# Patient Record
Sex: MALE | Race: WHITE
[De-identification: names, ages, dates, MRNs, and addresses within clinical notes are randomized per-mention and may not be internally consistent; named-entity substitution may affect disease eponyms.]

---

## 2017-06-27 ENCOUNTER — HOSPITAL ENCOUNTER (OUTPATIENT)
Dept: HOSPITAL 39 - GMAM | Age: 57
Discharge: HOME | End: 2017-06-27
Attending: FAMILY MEDICINE
Payer: MEDICARE

## 2017-06-27 DIAGNOSIS — E03.9: Primary | ICD-10-CM

## 2017-07-06 ENCOUNTER — HOSPITAL ENCOUNTER (OUTPATIENT)
Dept: HOSPITAL 39 - GMAM | Age: 57
Discharge: HOME | End: 2017-07-06
Attending: FAMILY MEDICINE
Payer: MEDICARE

## 2017-07-06 DIAGNOSIS — N40.0: Primary | ICD-10-CM

## 2018-02-03 ENCOUNTER — HOSPITAL ENCOUNTER (EMERGENCY)
Dept: HOSPITAL 39 - ER | Age: 58
Discharge: HOME | End: 2018-02-03
Payer: MEDICARE

## 2018-02-03 VITALS — TEMPERATURE: 99.6 F

## 2018-02-03 VITALS — SYSTOLIC BLOOD PRESSURE: 159 MMHG | OXYGEN SATURATION: 97 % | DIASTOLIC BLOOD PRESSURE: 97 MMHG

## 2018-02-03 DIAGNOSIS — E07.9: ICD-10-CM

## 2018-02-03 DIAGNOSIS — Z87.891: ICD-10-CM

## 2018-02-03 DIAGNOSIS — N13.2: Primary | ICD-10-CM

## 2018-02-03 PROCEDURE — 36415 COLL VENOUS BLD VENIPUNCTURE: CPT

## 2018-02-03 PROCEDURE — 85025 COMPLETE CBC W/AUTO DIFF WBC: CPT

## 2018-02-03 PROCEDURE — 81001 URINALYSIS AUTO W/SCOPE: CPT

## 2018-02-03 PROCEDURE — 80053 COMPREHEN METABOLIC PANEL: CPT

## 2018-02-03 PROCEDURE — 74019 RADEX ABDOMEN 2 VIEWS: CPT

## 2018-02-03 PROCEDURE — 74176 CT ABD & PELVIS W/O CONTRAST: CPT

## 2018-02-03 NOTE — CT
EXAM DESCRIPTION: 



Abdoment/Pelvis w/o Contrast2/3/2018 6:25 PM CST



CLINICAL HISTORY: 



57 years, Male, hematuria, left abd and flank pain 2d



COMPARISON: 



None.



TECHNIQUE: 



Volumetric CT acquisition was performed through the abdomen and

pelvis. Images in the axial and coronal planes were presented for

interpretation

This exam was performed according to our departmental

dose-optimization program, which includes automated exposure

control, adjustment of the mA and/or kV according to patient size

and/or use of iterative reconstruction technique.



FINDINGS: 



The visualized portions of the lung bases are clear. The

cardiomediastinal structures are within normal limits.





Within the upper abdomen, the liver and spleen are normal in size

and morphology.

The gallbladder is normal in morphology. The intra/extrahepatic

biliary tree is normal in appearance. The pancreas and adrenal

glands are normal. 



The right kidney is normal in size and the right ureter is normal

in course and caliber. There are no right renal calculi, distal

obstructing stones, or evidence of hydronephrosis/hydroureter. 



The left kidney is edematous and enlarged. There is a 3 mm

partially obstructing stone in the distal left ureter on axial

image 83. There is mild to moderate left-sided hydronephrosis and

hydroureter with periureteral and perinephric stranding. There

are no additional left renal calculi or ureteral stones.



The stomach and small intestines are within normal limits without

evidence of bowel dilation or wall thickening. 

The appendix is well-visualized and normal, best seen on axial

image 61 medial to the cecum. 



The colon is stool filled and unremarkable. 

There are moderate descending and sigmoid colonic diverticula

without associated wall thickening or inflammatory changes.  



Within the pelvis, the bladder and rectum are normal. The

prostate is age-appropriate



There are no pathologically enlarged inguinal, retroperitoneal,

portacaval, or mesenteric lymph nodes.

The soft tissue structures of the abdominal wall are normal.

There are multilevel degenerative changes throughout the lumbar

spine with disc space narrowing and vacuum disc phenomenon. There

is associated facet hypertrophy at multiple levels.





The abdominal aorta and its primary branches are normal in course

and caliber. 

Limited evaluation of the venous structures demonstrates no gross

abnormalities.



IMPRESSION: 



1. Partially obstructing 3 mm distal left ureteral stone.

2. No additional renal calculi or ureteral stones.

3. Degenerative changes of the lumbar spine..

4. Diverticulosis without evidence of diverticulitis.



Electronically signed by:  Syeda Stockton MD  2/3/2018 6:30 PM

[facility] Workstation: [id number]

## 2018-02-03 NOTE — RAD
EXAM DESCRIPTION: 



Abdomen Series



CLINICAL HISTORY: 



57 years, Male, left mid abd pain, urinary hesitancy



COMPARISON: 



None.



TECHNIQUE: 



Acute abdominal series



FINDINGS: 



The lungs are clear without focal consolidation or pleural

effusion.

The heart is normal in size. The mediastinal contours are normal.

The bowel gas pattern is normal.

There is no evidence of free air.

There are no abnormal masses or calcifications.

Limited evaluation of the liver, spleen, and kidneys demonstrate

no gross abnormalities.

There is mild levoscoliotic curvature of the lumbar spine.



IMPRESSION: 



1. No acute cardiopulmonary disease.

2. No acute intraabdominal process.



Electronically signed by:  Syeda Stockton MD  2/3/2018 5:15 PM

CST Workstation: [email or web address]

## 2018-02-03 NOTE — ED.PDOC
History of Present Illness





- General


Chief Complaint: Abdominal Pain


Stated Complaint: L flank discomfort


Time Seen by Provider: 18 16:27


Source: patient


Exam Limitations: no limitations





- History of Present Illness


Initial Comments: 





The patient is a 57-year-old  male presenting to the emergency room 

secondary to left-sided abdominal pain has been present and slightly worsening 

for the last couple of days.  He has started having some very mild dysuria 

today.  He has not had any kidney stones in the past.  No history of 

diverticulitis. No fevers.  No nausea vomiting or diarrhea.  No constipation.  

No syncope or near syncope. The abdominal pain is somewhat midabdominal and 

does radiate to the back.


Severity: moderate


Improving Factors: nothing


Worsening Factors: nothing


Associated Symptoms: denies symptoms


Allergies/Adverse Reactions: 


Allergies





NO KNOWN ALLERGY Allergy (Verified 18 16:44)


 








Home Medications: 


Ambulatory Orders





Ciprofloxacin [Cipro] 500 mg PO BID #14 tab 18 


Tamsulosin HCl [Flomax] 0.4 mg PO DAILY #14 cap 18 











Review of Systems





- Review of Systems


Constitutional: States: no symptoms reported


EENTM: States: no symptoms reported


Respiratory: States: no symptoms reported


Cardiology: States: no symptoms reported


Gastrointestinal/Abdominal: States: abdominal pain


Genitourinary: States: see HPI


Musculoskeletal: States: no symptoms reported


Skin: States: no symptoms reported


Neurological: States: no symptoms reported


Endocrine: States: no symptoms reported


All other Systems: No Change from Baseline





Past Medical History (General)





- Patient Medical History


Hx Stroke: No


Hx Congestive Heart Failure: No


Hx Thyroid Disease: Yes


Hx Diabetes: No


Hx Gastroesophageal Reflux:  - Constipation





- Social History


Hx Tobacco Use: Yes - Quit 





Family Medical History





- Family History


  ** Father


Living Status: 


Cause of Death: old age





Physical Exam





- Physical Exam


General Appearance: Alert, Comfortable, No apparent distress


Eye Exam: bilateral normal


Ears, Nose, Throat: hearing grossly normal, normal ENT inspection, normal 

pharynx


Neck: non-tender, full range of motion, supple


Respiratory: chest non-tender, lungs clear, normal breath sounds, no 

respiratory distress, no accessory muscle use


Cardiovascular/Chest: normal peripheral pulses, regular rate, rhythm, no edema


Peripheral Pulses: radial,right: 2+, radial,left: 2+, dorsalis pedis,right: 2+, 

dorsalis pedis,left: 2+


Gastrointestinal/Abdominal: soft, other - obese.  Mid left abdominal discomfort 

palpation.


Rectal Exam: deferred


Back Exam: normal inspection, CVA tenderness (L)


Extremity: normal range of motion, non-tender, normal inspection, no pedal edema

, normal capillary refill


Neurologic: CNs II-XII nml as tested, alert, normal mood/affect, oriented x 3


Skin Exam: normal color


Comments: 





 Vital Signs - 24 hr











  18





  16:45


 


Temperature 99.6 F


 


Pulse Rate [ 75





Left Radial] 


 


Respiratory 20





Rate 


 


Blood Pressure 164/110





[Left Arm] 


 


O2 Sat by Pulse 96





Oximetry 














Progress





- Progress


Progress: 





18 20:28


the patient is a 57-year-old  male presenting with a left distal 

ureteral 3mm stone with partial obstruction.  Urinalysis only shows a small 

amount of blood.  No obvious infection.  The patient is  placed on 

ciprofloxacin primarily prophylactically for the next 7 days.  He was given 2 L 

of IV fluids and a dose of Flomax as well as a dose of ciprofloxacin here.  He 

will be written for Flomax daily for the next 2 weeks.  He needs to increase 

his fluid intake significantly.  He needs to follow up with his primary care 

doctor in 3 days for repeat evaluation.  If he is not passing the stone he may 

yet have to have lithotripsy after that time.  Renal function appears adequate.

  ER warnings were given for his worsening.  He can continue his home pain 

medications and he can add Aleve 2 tablets twice daily with food for the next 5 

days.  ER warnings were given.





- Results/Orders


Results/Orders: 





 Vital Signs - 8 hr











  18





  16:45


 


Temperature 99.6 F


 


Pulse Rate [ 75





Left Radial] 


 


Respiratory 20





Rate 


 


Blood Pressure 164/110





[Left Arm] 


 


O2 Sat by Pulse 96





Oximetry 








 Laboratory Tests











  18





  17:28 19:10 19:10


 


WBC   9.9 


 


RBC   5.29 


 


Hgb   15.9 


 


Hct   45.9 


 


MCV   86.7 


 


MCH   30.1 


 


MCHC   34.7 


 


RDW   13.0 


 


Plt Count   124 L 


 


MPV   8.2 


 


Absolute Neuts (auto)   8.20 H 


 


Absolute Lymphs (auto)   1.10 


 


Absolute Monos (auto)   0.50 


 


Absolute Eos (auto)   0.00 


 


Absolute Basos (auto)   0.10 


 


Neutrophils %   83.0 H 


 


Lymphocytes %   10.9 L 


 


Monocytes %   5.4 


 


Eosinophils %   0.2 L 


 


Basophils %   0.5 


 


Sodium    138


 


Potassium    3.9


 


Chloride    105


 


Carbon Dioxide    24


 


Anion Gap    12.9


 


BUN    17


 


Creatinine    1.29


 


BUN/Creatinine Ratio    13.2


 


Random Glucose    200 H


 


Serum Osmolality    282.9


 


Calcium    9.3


 


Total Bilirubin    0.8


 


AST    23


 


ALT    26


 


Alkaline Phosphatase    91


 


Serum Total Protein    7.9


 


Albumin    4.4


 


Globulin    3.5


 


Albumin/Globulin Ratio    1.3


 


Urine Color  Yellow  


 


Urine Appearance  Clear  


 


Urine pH  5.0  


 


Ur Specific Gravity  >= 1.030  


 


Urine Protein  Negative  


 


Urine Glucose (UA)  Negative  


 


Urine Ketones  Trace  


 


Urine Blood  Large H  


 


Urine Nitrite  Negative  


 


Urine Bilirubin  Negative  


 


Urine Urobilinogen  0.2  


 


Ur Leukocyte Esterase  Negative  


 


Urine RBC  5-10 H  


 


Urine WBC  0  


 


Ur Epithelial Cells  0  


 


Calcium Oxalate Crystal  1+  


 


Urine Bacteria  0  








CT scan of abdomen and pelvis shows a 3 mmleft distal ureteral stone with 

partial obstruction.  He does have symptoms hydronephrosis on the left. No 

other acute intra-abdominal pathology.





Departure





- Departure


Clinical Impression: 


 Ureterolithiasis





Disposition: Discharge to Home or Self Care


Condition: Fair


Departure Forms:  ED Discharge - Pt. Copy, Patient Portal Self Enrollment


Instructions:  DI for Kidney Stones


Diet: regular diet


Activity: increase activity as tolerated


Referrals: 


Leighton Villaseñor MD [Primary Care Provider] - 1-5 Days


Prescriptions: 


Ciprofloxacin [Cipro] 500 mg PO BID #14 tab


Tamsulosin HCl [Flomax] 0.4 mg PO DAILY #14 cap


Home Medications: 


Ambulatory Orders





Ciprofloxacin [Cipro] 500 mg PO BID #14 tab 18 


Tamsulosin HCl [Flomax] 0.4 mg PO DAILY #14 cap 18 








Additional Instructions: 


the patient is a 57-year-old  male presenting with a left distal 

ureteral 3mm stone with partial obstruction.  Urinalysis only shows a small 

amount of blood.  No obvious infection.  The patient is  placed on 

ciprofloxacin primarily prophylactically for the next 7 days.  He was given 2 L 

of IV fluids and a dose of Flomax as well as a dose of ciprofloxacin here.  He 

will be written for Flomax daily for the next 2 weeks.  He needs to increase 

his fluid intake significantly.  He needs to follow up with his primary care 

doctor in 3 days for repeat evaluation.  If he is not passing the stone he may 

yet have to have lithotripsy after that time.  Renal function appears adequate.

  ER warnings were given for his worsening.  He can continue his home pain 

medications and he can add Aleve 2 tablets twice daily with food for the next 5 

days.  ER warnings were given. additionally the patient's blood sugar was 

moderately elevated today.  This should also be followed up with his primary 

care doctor.

## 2018-02-06 ENCOUNTER — HOSPITAL ENCOUNTER (OUTPATIENT)
Dept: HOSPITAL 39 - GMAM | Age: 58
End: 2018-02-06
Attending: FAMILY MEDICINE
Payer: MEDICARE

## 2018-02-06 DIAGNOSIS — N20.9: ICD-10-CM

## 2018-02-06 DIAGNOSIS — N20.0: Primary | ICD-10-CM

## 2018-02-06 DIAGNOSIS — N20.1: ICD-10-CM

## 2018-04-02 ENCOUNTER — HOSPITAL ENCOUNTER (OUTPATIENT)
Dept: HOSPITAL 39 - GMAM | Age: 58
End: 2018-04-02
Attending: FAMILY MEDICINE
Payer: MEDICARE

## 2018-04-02 DIAGNOSIS — E03.9: Primary | ICD-10-CM

## 2018-05-04 ENCOUNTER — HOSPITAL ENCOUNTER (OUTPATIENT)
Dept: HOSPITAL 39 - RAD | Age: 58
End: 2018-05-04
Attending: ORTHOPAEDIC SURGERY
Payer: MEDICARE

## 2018-05-04 DIAGNOSIS — M25.551: ICD-10-CM

## 2018-05-04 DIAGNOSIS — M25.552: ICD-10-CM

## 2018-05-04 DIAGNOSIS — M25.562: ICD-10-CM

## 2018-05-04 DIAGNOSIS — M25.561: Primary | ICD-10-CM

## 2018-05-04 NOTE — RAD
EXAM DESCRIPTION: Pelvis



CLINICAL HISTORY: 57 years Male, PAIN IN HIPS



COMPARISON: None.



TECHNIQUE: Frontal view of pelvis and hips



FINDINGS:



Bones of the pelvic ring appear intact. Moderate degenerative

changes in the lower lumbar spine and at the SI joints and at the

pubic symphysis. Mild narrowing of the hip joints is seen. There

is moderate spurring at the right femoral head neck junction.

Minimal spurring at the superior left femoral head neck junction.

Mildly prominent trabecular pattern. No fracture or dislocation.



IMPRESSION:



Degenerative changes as described.





Electronically signed by:  Pascual Shaw MD  5/4/2018 11:38

AM CDT Workstation: [id number]

## 2018-05-04 NOTE — RAD
EXAM DESCRIPTION: Knee,Right Complete



CLINICAL HISTORY: 57 years, Male, KNEE PAIN



COMPARISON: None



TECHNIQUE: Four views of the right knee



FINDINGS:



No fracture or dislocation. Bones appear normally mineralized

with normal trabecular pattern.



Narrowed appearance of medial more than lateral compartments on

frontal view. Prominent spurring at the tibial spines. Lateral

view shows normal position of the patella. Mild posterior

patellar spurring and superior enthesopathy. No definite

suprapatellar knee joint effusion. Normal contour of quadriceps

and patellar tendons.



No abnormal patellar tilt or subluxation  on patellar sunrise

view. Mild posterior medial patellar spurring.



IMPRESSION:



Degenerative changes as described.



Electronically signed by:  Pascual Shaw MD  5/4/2018 11:37

AM CDT Workstation: [id number]

## 2018-05-04 NOTE — RAD
EXAM DESCRIPTION: Knee,Left Complete



CLINICAL HISTORY: 57 years, Male, KNEE PAIN



COMPARISON: None



TECHNIQUE: Four views of the left knee including standing views



FINDINGS:



No fracture or dislocation. Bones appear normally mineralized

with normal trabecular pattern.



Narrowed appearance of medial compartment on standing view 45

degrees flexion. Lateral view shows normal position of the

patella. Mild superior anterior patellar enthesopathy. Question

small suprapatellar knee joint effusion. Normal contour of

quadriceps and patellar tendons.



No abnormal patellar tilt or subluxation  on patellar sunrise

view.



IMPRESSION:



Degenerative changes as described.



Electronically signed by:  Pascual Shaw MD  5/4/2018 11:36

AM CDT Workstation: [id number]

## 2018-10-01 ENCOUNTER — HOSPITAL ENCOUNTER (OUTPATIENT)
Dept: HOSPITAL 39 - GMAM | Age: 58
End: 2018-10-01
Attending: FAMILY MEDICINE
Payer: MEDICARE

## 2018-10-01 DIAGNOSIS — Z12.5: ICD-10-CM

## 2018-10-01 DIAGNOSIS — E03.9: Primary | ICD-10-CM

## 2018-10-01 PROCEDURE — 84439 ASSAY OF FREE THYROXINE: CPT

## 2018-10-01 PROCEDURE — 84443 ASSAY THYROID STIM HORMONE: CPT

## 2018-10-10 ENCOUNTER — HOSPITAL ENCOUNTER (OUTPATIENT)
Dept: HOSPITAL 39 - GMAM | Age: 58
End: 2018-10-10
Attending: FAMILY MEDICINE
Payer: MEDICARE

## 2018-10-10 DIAGNOSIS — J30.1: Primary | ICD-10-CM

## 2018-10-10 DIAGNOSIS — E55.9: ICD-10-CM

## 2018-10-10 DIAGNOSIS — R53.83: ICD-10-CM

## 2019-06-10 ENCOUNTER — HOSPITAL ENCOUNTER (OUTPATIENT)
Dept: HOSPITAL 39 - CT | Age: 59
End: 2019-06-10
Attending: OTOLARYNGOLOGY
Payer: MEDICARE

## 2019-06-10 DIAGNOSIS — J32.9: Primary | ICD-10-CM

## 2019-06-11 NOTE — CT
EXAM DESCRIPTION:

Sinuses



CLINICAL HISTORY:

58 years Male, CHRONIC SINUSITIS



COMPARISON:

None.



TECHNIQUE:

CT of the paranasal sinuses was performed without IV contrast.

This exam was performed according to our departmental

dose-optimization program, which includes automated exposure

control, adjustment of the mA and/or kV according to patient size

and/or use of iterative reconstruction technique.



FINDINGS:

The paranasal sinuses are clear. The ethmoid and visualized

portions of the mastoid air cells are unremarkable. No fluid in

the middle ear. Rightward nasal septal bowing. Turbinates and

nasal passages are unremarkable. The ostiomeatal units are

bilaterally patent. No intraorbital inflammation or mass.



IMPRESSION:

Rightward nasal septal bowing, but no sinusitis. 



Electronically signed by:  Craig Bird MD  6/11/2019 7:35 AM CDT

Workstation: [id number]

## 2019-10-02 ENCOUNTER — HOSPITAL ENCOUNTER (OUTPATIENT)
Dept: HOSPITAL 39 - GMAM | Age: 59
End: 2019-10-02
Attending: FAMILY MEDICINE
Payer: MEDICARE

## 2019-10-02 DIAGNOSIS — Z12.5: Primary | ICD-10-CM

## 2019-10-02 DIAGNOSIS — I10: ICD-10-CM

## 2019-10-02 DIAGNOSIS — E03.9: ICD-10-CM

## 2019-10-02 PROCEDURE — 84439 ASSAY OF FREE THYROXINE: CPT

## 2019-10-02 PROCEDURE — 84443 ASSAY THYROID STIM HORMONE: CPT

## 2019-10-03 ENCOUNTER — HOSPITAL ENCOUNTER (OUTPATIENT)
Dept: HOSPITAL 39 - CT | Age: 59
End: 2019-10-03
Attending: FAMILY MEDICINE
Payer: MEDICARE

## 2019-10-03 DIAGNOSIS — R05: ICD-10-CM

## 2019-10-03 DIAGNOSIS — R91.8: Primary | ICD-10-CM

## 2019-10-03 NOTE — CT
EXAM DESCRIPTION: Chest w/o Contrast



CLINICAL HISTORY: 59 years Male, COUGH



COMPARISON: None available



TECHNIQUE: Contiguous thin section axial images were obtained

from the supraclavicular region to below the diaphragm level

without the use of intravenous contrast. Sagittal and coronal

reconstructions were reviewed.

This exam was performed according to our departmental

dose-optimization program, which includes automated exposure

control, adjustment of the mA and/or kV according to patient size

and/or use of iterative reconstruction technique.



FINDINGS: 

The visualized thyroid gland appears grossly unremarkable. No

enlarged axillary, supraclavicular, mediastinal or hilar

lymphadenopathy.



The heart size appears within normal limits. No pericardial

effusion. Limited evaluation of the second in the lack of

intravenous contrast.



The tracheobronchial tree is patent. Review of the lung windows

demonstrate a 1.9 cm nodular opacity in the apical posterior

segment of left upper lobe could represent

infectious/inflammatory etiology, however underlying malignancy

cannot be completely excluded. A 9 mm mildly spiculated nodular

opacity in the right lung apex also concerning for malignancy

although could represent infectious/inflammatory etiology. No

acute consolidation or pleural effusion. No other suspicious

nodules or abnormal mass lesions identified.



The esophagus appears grossly normal throughout its length. The

visualized upper abdomen appears grossly unremarkable.



Review of the bone windows demonstrate no acute osseous

abnormality. Diffuse osteopenia of the thoracolumbar spine noted

1.9 cm nodular opacity.



IMPRESSION: 

1. Focal 1.9 cm mildly spiculated nodular opacity in the

apicoposterior segment of left upper lobe could represent

infectious/inflammatory etiology, however underlying malignancy

cannot be completely excluded. Another 9 mm mildly spiculated

opacity in the apical segment of right upper lobe could also

represent infectious/inflammatory etiology, however concerning

underlying malignancy cannot be completely excluded. Close

interval follow-up versus biopsy of the largest lesion in the

left upper lobe is recommended to exclude malignancy.

2. No pleural effusions.

3. No enlarged mediastinal or hilar lymphadenopathy.



Electronically signed by:  Ted Hodges MD  10/3/2019

3:32 PM CDT Workstation: [id number]

## 2019-10-11 ENCOUNTER — HOSPITAL ENCOUNTER (OUTPATIENT)
Dept: HOSPITAL 39 - CT | Age: 59
End: 2019-10-11
Attending: FAMILY MEDICINE
Payer: MEDICARE

## 2019-10-11 DIAGNOSIS — K57.30: ICD-10-CM

## 2019-10-11 DIAGNOSIS — K76.9: ICD-10-CM

## 2019-10-11 DIAGNOSIS — E55.9: ICD-10-CM

## 2019-10-11 DIAGNOSIS — M47.896: ICD-10-CM

## 2019-10-11 DIAGNOSIS — M47.897: ICD-10-CM

## 2019-10-11 DIAGNOSIS — N28.1: ICD-10-CM

## 2019-10-11 DIAGNOSIS — N40.0: ICD-10-CM

## 2019-10-11 DIAGNOSIS — R05: ICD-10-CM

## 2019-10-11 DIAGNOSIS — R63.4: Primary | ICD-10-CM

## 2019-10-13 NOTE — CT
EXAM DESCRIPTION: 

Abdomen/Pelvis w/Contrast: Computed Tomography.



CLINICAL HISTORY: 

59 years Male ABN WEIGHT LOSS



COMPARISON: 

CT scan abdomen and pelvis without IV contrast February 3, 2018.



TECHNIQUE: 

Spiral-axial scans at 5 x 5 mm intervals through the abdomen and

pelvis, after nonionic IV contrast and water-soluble oral

contrast.  Coronal and sagittal 2.0 mm reconstructions. Delayed

scans, liver through the pelvis.   Axial-spiral 5mm. No adverse

reactions.  Total Exam DLP: 2626.57 mGy-cm.  This exam was

performed according to our departmental dose-optimization program

which includes automated exposure control, adjustment of the mA

and/or kV according to patient size and/or use of iterative

reconstruction technique; to reduce radiation dose to as low as

reasonably achievable (ALARA).



FINDINGS: 

Lung bases and pleura: Minimal posterior bibasilar atelectasis.

Coronary artery calcifications.



Liver, Stomach, Spleen, Adrenal Glands: Oral contrast in the

stomach not distended. Solid organs are negative except for

calcified nodule on the inferior medial capsule of the liver

right lobe, seen on the prior study.



Pancreas, Gallbladder, Ducts: Negative.



Kidneys and Ureters: 1.5 cm cyst upper pole right kidney stable.



Mesentery: Negative.



Aorta: Minimal atherosclerotic calcification.



Small Bowel: Contains oral contrast no distention or significant

air-fluid levels.



Terminal Ileum/Cecum: Normal caliber. Appendix negative. No

inflammatory fatty changes.



Colon: Multiple diverticula more numerous distally. No

complications.



Pelvic Organs: Prostate gland enlarged and impressing on the base

of the urinary bladder. Transverse diameter 5.4 x 4.2 cm. 5 cm

craniocaudal. Impressing also on the seminal vesicles. No free

fluid or solitary pelvic mass. No radiodense stones in the

urinary bladder.



Spine and Bony Pelvis: Mild arthrosis in the bilateral hip joints

and changes of femoral acetabular impingement junction of the

lateral left femoral head and neck. Spondylosis and scoliosis

lumbar spine. Loss of height in the right L2 vertebral body near

stenosis of the foramina L3-4 and L5-S1.



Abdominal Wall/Back Soft Tissues: Diastases of the umbilicus but

no bowel herniation.



IMPRESSION: 

1. Diverticulosis distal transverse colon to the sigmoid with no

complications.

2. Stable 1.5 cm cyst upper pole right kidney. Stable

calcification inferior medial capsule right liver.

3. Prostatic enlargement impressing on the urinary bladder and

seminal vesicles. Minimal enlargement since the prior study.

4. Spondylosis lumbar spine and scoliosis with significant canal

narrowing and foraminal narrowing L3-4 and L5-S1.



Electronically signed by:  Cuauhtemoc Tavares MD  10/13/2019 10:37 AM

CDT Workstation: [id number]

## 2020-01-14 ENCOUNTER — HOSPITAL ENCOUNTER (OUTPATIENT)
Dept: HOSPITAL 39 - CT | Age: 60
End: 2020-01-14
Attending: INTERNAL MEDICINE
Payer: MEDICARE

## 2020-01-14 DIAGNOSIS — J47.9: ICD-10-CM

## 2020-01-14 DIAGNOSIS — R91.8: Primary | ICD-10-CM

## 2020-01-14 NOTE — CT
EXAM DESCRIPTION: Chest w/o Contrast



CLINICAL HISTORY: 59 years Male, PNEUMONIA DUE TO HISTOPLASMA



COMPARISON: CT chest dated 10/3/2019.



TECHNIQUE: Contiguous thin section axial images were obtained

from the supraclavicular region to below the diaphragm level

without the use of intravenous contrast. Sagittal and coronal

reconstructions were reviewed.

This exam was performed according to our departmental

dose-optimization program, which includes automated exposure

control, adjustment of the mA and/or kV according to patient size

and/or use of iterative reconstruction technique.



FINDINGS: 

Visualized thyroid gland appears normal. No abnormally enlarged

lymph nodes in the mediastinum, bilateral hilar or axillary

regions.



Trachea is midline. Scarring in the right lung apex with

associated bronchiectasis. Airspace opacity in the posterior

segment of the left upper lobe appears more cavitary on today's

examination and measures 1.4 x 1.2 cm compared to 1.7 x 1.5 cm on

prior study. No other new consolidative or groundglass changes.

No pleural effusions.



Heart is normal in size without pericardial effusion. Thoracic

aorta appears normal. Esophagus appears normal throughout its

length.



The visualized upper abdomen also appears normal.



Mild degenerative changes throughout the visualized spine.



IMPRESSION: 

1. Unchanged scarring and/or airspace opacity in the right lung

apex with associated bronchiectasis. Airspace opacity in the left

upper lobe appears more cavitary on today's study and has

decreased in size. Continued follow-up is recommended to document

resolution.



Electronically signed by:  Ted Hodges MD  1/14/2020

2:36 PM CST Workstation: [id number]

## 2020-01-24 ENCOUNTER — HOSPITAL ENCOUNTER (OUTPATIENT)
Dept: HOSPITAL 39 - MRI | Age: 60
End: 2020-01-24
Payer: MEDICARE

## 2020-01-24 DIAGNOSIS — M48.062: ICD-10-CM

## 2020-01-24 DIAGNOSIS — M51.36: ICD-10-CM

## 2020-01-24 DIAGNOSIS — M53.3: ICD-10-CM

## 2020-01-24 DIAGNOSIS — M51.26: ICD-10-CM

## 2020-01-24 DIAGNOSIS — M25.78: ICD-10-CM

## 2020-01-24 DIAGNOSIS — M24.28: ICD-10-CM

## 2020-01-24 DIAGNOSIS — M48.07: ICD-10-CM

## 2020-01-24 DIAGNOSIS — M47.896: ICD-10-CM

## 2020-01-24 DIAGNOSIS — M41.86: ICD-10-CM

## 2020-01-24 DIAGNOSIS — M54.16: Primary | ICD-10-CM

## 2020-01-24 DIAGNOSIS — G60.3: ICD-10-CM

## 2020-01-25 NOTE — MRI
EXAM DESCRIPTION: 

Lumbar Spine w/o Contrast : Magnetic Resonance Imaging.



CLINICAL HISTORY: 

Back pain



COMPARISON: 

MRI scan lumbar spine December 2016.



TECHNIQUE: 

Multiplanar, multiple standard sequences, non contrast MRI,

lumbar spine.



FINDINGS: 

L5-S1: The disc is well visualized on axial T2 series 501, image

3.  moderate disc space loss and disc desiccation. Left side

moderate endplate reactive changes. Anterior right of midline

large Schmorl's node superior S1 endplate which contains fluid.

Degenerative hypertrophy of the posterior elements (facet joints

and posterior flavum ligaments), left more than right. Marked

narrowing of the left subarticular recess. Left side disc

osteophyte complex encroaching on the foramen with moderate

stenosis and compromise of the left L5 nerve. Borderline right

foraminal stenosis abutting the right L5 nerve.



L4-L5: Disc desiccation and minimal disc space loss. Anterior

moderate endplate reactive changes. Posterior left 5 mm disc

bulge with minimal inferior extrusion effacing the left

subarticular recess and the descending left L5 nerve. Paracentral

AP canal diameter 11 mm. Mild degenerative hypertrophy of the

posterior elements. Moderate right foraminal narrowing and

borderline left foraminal stenosis encroaching on the left L4

nerve.



L3-L4: Disc desiccation with minimal to moderate disc space loss

to the right of midline and moderate endplate reactive changes.

Schmorl's node posterior mid inferior L3 endplate. Posterior

broad-based disc bulge abutting the thecal sac. Degenerative

hypertrophy of the posterior elements unremarkable left. AP canal

diameter 12 mm. Right side disc spur complex encroaching on the

foramen which is mildly stenotic and also the right L3 nerve.

Moderate to severe narrowing of the left foramen.



L2-L3: Disc desiccation posterior broad-based bulge. Right

posterior 6 mm protrusion and 1 cm extrusion superiorly.

Impressing on the descending right L2 nerve and the right lateral

recess. Degenerative hypertrophy of the posterior elements, more

right than left. AP canal diameter 12 mm to the right of midline.

Moderate narrowing right foramen. Mild narrowing left foramen.



L1-L2: Disc desiccation anterior bulging and Schmorl's node and

inferior L1 endplate reaction to right of midline. Posterior

broad-based bulge minimal. Mild hypertrophic degeneration of the

posterior elements. Mild canal narrowing. Borderline right

foraminal stenosis. Moderate left foraminal narrowing.



T12-L1: Disc desiccation minimal disc space loss. No bulging.

Minimal hypertrophy of the posterior elements. Canal and foramina

are patent. Conus terminates at this level.



 Moderate levoscoliosis mid lumbar spine.  Paravertebral soft

tissues minimal paraspinal muscle atrophy.. Distal cord normal

signal and caliber.  Normal marrow signal in the remaining

vertebral bodies and the posterior elements. Vertebral bodies are

not compressed at any level.



IMPRESSION: 

1. Degenerative scoliosis with multiple levels of hypertrophic

degeneration in the facet ligaments and posterior flavum

ligaments, multiple levels of spondylosis and endplate changes

with disc space loss.

2. Moderate left L5-S1 foraminal stenosis and compromise of the

left L5 nerve with borderline right foraminal stenosis; stable

since the prior study. Possible compromise of the right L5 nerve.

This is progressed since the prior study. Left subarticular

recess narrowing is also progressed since the prior study.

Enlarging Schmorl's node right anterior superior S1 endplate.

3. Posterior left L4-L5 disc bulge/herniation and inferior

extrusion effacing the left subarticular recess and compromising

the descending left L5 nerve. Stable since the prior study.

Borderline left foraminal stenosis encroaching on the left L4

nerve stable since the prior study.

4. Right side spondylosis and disc spur complex at L3-L4

encroaching on the foramen and the right L3 nerve stable since

the prior study. Moderate to severe narrowing of the left

foramen. Moderate narrowing of the canal.

5. Right posterior L2-L3 herniation with superior extrusion into

the right subarticular recess impinging the descending right L2

nerve. New since the prior study. Moderate canal narrowing and

moderate to severe right foraminal narrowing stable since the

prior study.

6. Mild spondylosis L1-L2 with multifactorial borderline right

foraminal stenosis abutting the exiting right L1 nerve. Stable

since the prior study.



Electronically signed by:  Cuauhtemoc Tavares MD  1/25/2020 10:41 PM

[facility] Workstation: [id number]

## 2020-04-30 ENCOUNTER — HOSPITAL ENCOUNTER (OUTPATIENT)
Dept: HOSPITAL 39 - GMAM | Age: 60
End: 2020-04-30
Attending: FAMILY MEDICINE
Payer: MEDICARE

## 2020-04-30 DIAGNOSIS — I10: ICD-10-CM

## 2020-04-30 DIAGNOSIS — E78.2: ICD-10-CM

## 2020-04-30 DIAGNOSIS — E55.9: ICD-10-CM

## 2020-04-30 DIAGNOSIS — E03.9: Primary | ICD-10-CM

## 2020-07-02 ENCOUNTER — HOSPITAL ENCOUNTER (OUTPATIENT)
Dept: HOSPITAL 39 - CT | Age: 60
End: 2020-07-02
Attending: INTERNAL MEDICINE
Payer: MEDICARE

## 2020-07-02 DIAGNOSIS — R91.8: ICD-10-CM

## 2020-07-02 DIAGNOSIS — B39.2: ICD-10-CM

## 2020-07-02 DIAGNOSIS — J44.9: Primary | ICD-10-CM

## 2020-07-02 NOTE — CT
EXAM DESCRIPTION: 



Chest w/o Contrast



CLINICAL HISTORY: 



COPD



COMPARISON: 



January 14, 2020



TECHNIQUE: 



Chest CT was performed without IV contrast.  This exam was

performed according to our departmental dose-optimization

program, which includes automated exposure control, adjustment of

the mA and/or kV according to patient size and/or use of

iterative reconstruction technique.



FINDINGS:



No thoracic aortic aneurysm. Limited sensitivity for detection of

adenopathy due to lack of IV contrast, no mediastinal or hilar

adenopathy is seen. No pleural or pericardial effusion. No

esophageal wall thickening or hiatal hernia. The main pulmonary

artery is not dilated.



The central airways are clear. No airspace consolidation or lung

mass. Two small nodular lesions in the right lung apex C2

measuring approximately 7 mm diameter, both stable from January, 2020. These are also stable from an older exam performed October, 2019. Adjacent linear density likely related to subsegmental

atelectasis or scarring. Small pneumatocele in the left upper

lung measuring approximately 9 mm diameter at the location of a

larger solid nodule seen in the left upper lobe on prior imaging.

No new lung nodule.



Visualized portions of the upper abdomen are unremarkable for

noncontrast technique. No fracture or pneumothorax. No lytic or

scarring bone lesion.



IMPRESSION: 



Two subcentimeter nodules in the right lung apex as detailed

above, both stable from October, 2019. These are probably benign.

Based on their size, additional follow-up CT in one year is

recommended to document continued stability.



Interval resolution of a small nodular lesion seen in the left

upper lobe on prior imaging with only a subcentimeter

pneumatocele present at the same location on today's exam. No new

abnormality.



Electronically signed by:  Craig Bird MD  7/2/2020 9:32 AM CDT

Workstation: [id number]

## 2020-07-14 ENCOUNTER — HOSPITAL ENCOUNTER (OUTPATIENT)
Dept: HOSPITAL 39 - LAB.O | Age: 60
End: 2020-07-14
Payer: MEDICARE

## 2020-07-14 DIAGNOSIS — G35: ICD-10-CM

## 2020-07-14 DIAGNOSIS — M81.8: ICD-10-CM

## 2020-07-14 DIAGNOSIS — G70.00: ICD-10-CM

## 2020-07-14 DIAGNOSIS — G44.1: ICD-10-CM

## 2020-07-14 DIAGNOSIS — R27.9: ICD-10-CM

## 2020-07-14 DIAGNOSIS — G04.89: ICD-10-CM

## 2020-07-14 DIAGNOSIS — L81.2: ICD-10-CM

## 2020-07-14 DIAGNOSIS — Z74.09: ICD-10-CM

## 2020-07-14 DIAGNOSIS — G37.9: ICD-10-CM

## 2020-07-14 DIAGNOSIS — K50.90: ICD-10-CM

## 2020-07-14 DIAGNOSIS — M06.9: ICD-10-CM

## 2020-07-14 DIAGNOSIS — M79.7: ICD-10-CM

## 2020-07-14 DIAGNOSIS — Z79.899: ICD-10-CM

## 2020-07-14 DIAGNOSIS — M54.81: ICD-10-CM

## 2020-07-14 DIAGNOSIS — E78.49: ICD-10-CM

## 2020-07-14 DIAGNOSIS — F01.50: ICD-10-CM

## 2020-07-14 DIAGNOSIS — R50.9: ICD-10-CM

## 2020-07-14 DIAGNOSIS — I67.1: Primary | ICD-10-CM

## 2020-07-14 DIAGNOSIS — M31.6: ICD-10-CM

## 2020-07-14 DIAGNOSIS — D86.9: ICD-10-CM

## 2020-07-14 DIAGNOSIS — G25.89: ICD-10-CM

## 2020-07-14 DIAGNOSIS — M33.90: ICD-10-CM

## 2020-07-14 DIAGNOSIS — E11.9: ICD-10-CM

## 2020-07-14 DIAGNOSIS — F81.9: ICD-10-CM

## 2020-07-14 DIAGNOSIS — I73.00: ICD-10-CM

## 2020-07-14 DIAGNOSIS — M15.0: ICD-10-CM

## 2020-07-14 DIAGNOSIS — M10.9: ICD-10-CM

## 2020-07-14 DIAGNOSIS — R53.83: ICD-10-CM

## 2020-07-14 DIAGNOSIS — R53.81: ICD-10-CM

## 2020-07-14 DIAGNOSIS — M33.22: ICD-10-CM

## 2020-07-14 DIAGNOSIS — G61.81: ICD-10-CM

## 2020-07-14 DIAGNOSIS — E03.9: ICD-10-CM

## 2020-07-14 DIAGNOSIS — M32.10: ICD-10-CM

## 2020-07-14 DIAGNOSIS — M25.50: ICD-10-CM

## 2020-07-14 DIAGNOSIS — M35.3: ICD-10-CM

## 2020-07-14 DIAGNOSIS — M51.16: ICD-10-CM

## 2020-10-28 ENCOUNTER — HOSPITAL ENCOUNTER (OUTPATIENT)
Dept: HOSPITAL 39 - GMAM | Age: 60
End: 2020-10-28
Attending: FAMILY MEDICINE
Payer: MEDICARE

## 2020-10-28 DIAGNOSIS — E55.9: ICD-10-CM

## 2020-10-28 DIAGNOSIS — R73.9: ICD-10-CM

## 2020-10-28 DIAGNOSIS — Z12.5: Primary | ICD-10-CM

## 2020-10-28 DIAGNOSIS — E03.9: ICD-10-CM

## 2020-10-28 DIAGNOSIS — I10: ICD-10-CM

## 2020-10-28 PROCEDURE — 84443 ASSAY THYROID STIM HORMONE: CPT

## 2020-10-28 PROCEDURE — 82306 VITAMIN D 25 HYDROXY: CPT

## 2020-10-28 PROCEDURE — 84439 ASSAY OF FREE THYROXINE: CPT
